# Patient Record
Sex: MALE | Race: ASIAN | NOT HISPANIC OR LATINO | URBAN - METROPOLITAN AREA
[De-identification: names, ages, dates, MRNs, and addresses within clinical notes are randomized per-mention and may not be internally consistent; named-entity substitution may affect disease eponyms.]

---

## 2017-11-21 ENCOUNTER — AMBULATORY - HEALTHEAST (OUTPATIENT)
Dept: ADMINISTRATIVE | Facility: CLINIC | Age: 67
End: 2017-11-21

## 2017-11-21 RX ORDER — ALBUTEROL SULFATE 0.83 MG/ML
2.5 SOLUTION RESPIRATORY (INHALATION) EVERY 6 HOURS PRN
Status: SHIPPED | COMMUNITY
Start: 2017-11-21

## 2017-11-21 RX ORDER — HYDROCORTISONE 10 MG/1
15 TABLET ORAL DAILY
Status: SHIPPED | COMMUNITY
Start: 2017-11-21

## 2017-11-21 RX ORDER — ALBUTEROL SULFATE 90 UG/1
2 AEROSOL, METERED RESPIRATORY (INHALATION) EVERY 4 HOURS PRN
Status: SHIPPED | COMMUNITY
Start: 2017-11-21

## 2017-11-21 RX ORDER — CODEINE PHOSPHATE AND GUAIFENESIN 10; 100 MG/5ML; MG/5ML
10 SOLUTION ORAL EVERY 4 HOURS PRN
Status: SHIPPED | COMMUNITY
Start: 2017-11-21

## 2017-11-27 ENCOUNTER — OFFICE VISIT - HEALTHEAST (OUTPATIENT)
Dept: GERIATRICS | Facility: CLINIC | Age: 67
End: 2017-11-27

## 2017-11-27 DIAGNOSIS — R53.81 PHYSICAL DECONDITIONING: ICD-10-CM

## 2017-11-27 DIAGNOSIS — J18.9 PNEUMONIA: ICD-10-CM

## 2017-11-27 DIAGNOSIS — A31.8 MYCOBACTERIUM ABSCESSUS INFECTION: ICD-10-CM

## 2017-11-27 DIAGNOSIS — E27.40 ADRENAL INSUFFICIENCY (H): ICD-10-CM

## 2017-11-27 DIAGNOSIS — M10.9 GOUT: ICD-10-CM

## 2017-11-27 DIAGNOSIS — Z86.73 H/O CEREBRAL INFARCTION: ICD-10-CM

## 2017-11-27 DIAGNOSIS — H91.90 HEARING LOSS: ICD-10-CM

## 2017-11-27 DIAGNOSIS — E46 MALNUTRITION (H): ICD-10-CM

## 2017-11-30 ENCOUNTER — OFFICE VISIT - HEALTHEAST (OUTPATIENT)
Dept: GERIATRICS | Facility: CLINIC | Age: 67
End: 2017-11-30

## 2017-11-30 DIAGNOSIS — E27.40 ADRENAL INSUFFICIENCY (H): ICD-10-CM

## 2017-11-30 DIAGNOSIS — J18.9 PNEUMONIA: ICD-10-CM

## 2017-11-30 DIAGNOSIS — J47.9 BRONCHIECTASIS (H): ICD-10-CM

## 2017-11-30 DIAGNOSIS — A31.8 MYCOBACTERIUM ABSCESSUS INFECTION: ICD-10-CM

## 2017-11-30 DIAGNOSIS — E46 MALNUTRITION (H): ICD-10-CM

## 2017-11-30 DIAGNOSIS — R53.81 PHYSICAL DECONDITIONING: ICD-10-CM

## 2017-12-04 ENCOUNTER — OFFICE VISIT - HEALTHEAST (OUTPATIENT)
Dept: GERIATRICS | Facility: CLINIC | Age: 67
End: 2017-12-04

## 2017-12-04 DIAGNOSIS — E27.40 ADRENAL INSUFFICIENCY (H): ICD-10-CM

## 2017-12-04 DIAGNOSIS — A31.8 MYCOBACTERIUM ABSCESSUS INFECTION: ICD-10-CM

## 2017-12-04 DIAGNOSIS — J47.9 BRONCHIECTASIS (H): ICD-10-CM

## 2017-12-04 DIAGNOSIS — E46 MALNUTRITION (H): ICD-10-CM

## 2017-12-04 DIAGNOSIS — R53.81 PHYSICAL DECONDITIONING: ICD-10-CM

## 2017-12-04 DIAGNOSIS — J18.9 PNEUMONIA: ICD-10-CM

## 2017-12-08 ENCOUNTER — OFFICE VISIT - HEALTHEAST (OUTPATIENT)
Dept: GERIATRICS | Facility: CLINIC | Age: 67
End: 2017-12-08

## 2017-12-08 DIAGNOSIS — J18.9 PNEUMONIA: ICD-10-CM

## 2017-12-08 DIAGNOSIS — A31.8 MYCOBACTERIUM ABSCESSUS INFECTION: ICD-10-CM

## 2017-12-08 DIAGNOSIS — R05.9 COUGH: ICD-10-CM

## 2017-12-08 DIAGNOSIS — R53.81 PHYSICAL DECONDITIONING: ICD-10-CM

## 2017-12-08 DIAGNOSIS — E46 MALNUTRITION (H): ICD-10-CM

## 2017-12-08 DIAGNOSIS — J47.9 BRONCHIECTASIS (H): ICD-10-CM

## 2017-12-20 ENCOUNTER — OFFICE VISIT - HEALTHEAST (OUTPATIENT)
Dept: GERIATRICS | Facility: CLINIC | Age: 67
End: 2017-12-20

## 2017-12-20 DIAGNOSIS — R53.81 PHYSICAL DECONDITIONING: ICD-10-CM

## 2017-12-20 DIAGNOSIS — Z86.73 H/O CEREBRAL INFARCTION: ICD-10-CM

## 2017-12-20 DIAGNOSIS — A31.8 MYCOBACTERIUM ABSCESSUS INFECTION: ICD-10-CM

## 2017-12-20 DIAGNOSIS — M10.9 GOUT: ICD-10-CM

## 2017-12-20 DIAGNOSIS — R06.02 SOB (SHORTNESS OF BREATH): ICD-10-CM

## 2018-01-24 ENCOUNTER — OFFICE VISIT - HEALTHEAST (OUTPATIENT)
Dept: GERIATRICS | Facility: CLINIC | Age: 68
End: 2018-01-24

## 2018-01-24 DIAGNOSIS — H91.90 HEARING LOSS: ICD-10-CM

## 2018-01-24 DIAGNOSIS — R06.02 SOB (SHORTNESS OF BREATH): ICD-10-CM

## 2018-01-24 DIAGNOSIS — R05.9 COUGH: ICD-10-CM

## 2018-01-24 DIAGNOSIS — A31.8 MYCOBACTERIUM ABSCESSUS INFECTION: ICD-10-CM

## 2018-01-24 DIAGNOSIS — Z86.73 H/O CEREBRAL INFARCTION: ICD-10-CM

## 2018-01-25 ENCOUNTER — OFFICE VISIT - HEALTHEAST (OUTPATIENT)
Dept: GERIATRICS | Facility: CLINIC | Age: 68
End: 2018-01-25

## 2018-01-25 DIAGNOSIS — R05.3 CHRONIC COUGH: ICD-10-CM

## 2018-01-25 DIAGNOSIS — L85.3 XEROSIS OF SKIN: ICD-10-CM

## 2018-01-25 DIAGNOSIS — A31.8 MYCOBACTERIUM ABSCESSUS INFECTION: ICD-10-CM

## 2018-02-07 ENCOUNTER — AMBULATORY - HEALTHEAST (OUTPATIENT)
Dept: GERIATRICS | Facility: CLINIC | Age: 68
End: 2018-02-07

## 2018-02-15 ENCOUNTER — OFFICE VISIT - HEALTHEAST (OUTPATIENT)
Dept: GERIATRICS | Facility: CLINIC | Age: 68
End: 2018-02-15

## 2018-02-15 DIAGNOSIS — J47.9 BRONCHIECTASIS (H): ICD-10-CM

## 2018-02-15 DIAGNOSIS — L29.9 PRURITUS: ICD-10-CM

## 2018-02-15 DIAGNOSIS — A31.8 MYCOBACTERIUM ABSCESSUS INFECTION: ICD-10-CM

## 2018-02-15 DIAGNOSIS — R05.3 CHRONIC COUGH: ICD-10-CM

## 2018-03-14 ENCOUNTER — AMBULATORY - HEALTHEAST (OUTPATIENT)
Dept: GERIATRICS | Facility: CLINIC | Age: 68
End: 2018-03-14

## 2018-03-14 RX ORDER — ALLOPURINOL 100 MG/1
100 TABLET ORAL DAILY
Status: SHIPPED | COMMUNITY
Start: 2018-03-14

## 2018-03-14 RX ORDER — DIPHENHYDRAMINE HCL 25 MG
25 CAPSULE ORAL EVERY 8 HOURS PRN
Status: SHIPPED | COMMUNITY
Start: 2018-03-14

## 2018-03-15 ENCOUNTER — RECORDS - HEALTHEAST (OUTPATIENT)
Dept: LAB | Facility: CLINIC | Age: 68
End: 2018-03-15

## 2018-03-15 LAB
ALBUMIN SERPL-MCNC: 3.2 G/DL (ref 3.5–5)
ALP SERPL-CCNC: 89 U/L (ref 45–120)
ALT SERPL W P-5'-P-CCNC: 18 U/L (ref 0–45)
ANION GAP SERPL CALCULATED.3IONS-SCNC: 8 MMOL/L (ref 5–18)
AST SERPL W P-5'-P-CCNC: 17 U/L (ref 0–40)
BASOPHILS # BLD AUTO: 0 THOU/UL (ref 0–0.2)
BASOPHILS NFR BLD AUTO: 0 % (ref 0–2)
BILIRUB SERPL-MCNC: 0.4 MG/DL (ref 0–1)
BUN SERPL-MCNC: 6 MG/DL (ref 8–22)
CALCIUM SERPL-MCNC: 8.3 MG/DL (ref 8.5–10.5)
CHLORIDE BLD-SCNC: 105 MMOL/L (ref 98–107)
CHOLEST SERPL-MCNC: 135 MG/DL
CO2 SERPL-SCNC: 28 MMOL/L (ref 22–31)
CREAT SERPL-MCNC: 0.74 MG/DL (ref 0.7–1.3)
EOSINOPHIL # BLD AUTO: 0.4 THOU/UL (ref 0–0.4)
EOSINOPHIL NFR BLD AUTO: 6 % (ref 0–6)
ERYTHROCYTE [DISTWIDTH] IN BLOOD BY AUTOMATED COUNT: 17.7 % (ref 11–14.5)
FASTING STATUS PATIENT QL REPORTED: ABNORMAL
GFR SERPL CREATININE-BSD FRML MDRD: >60 ML/MIN/1.73M2
GLUCOSE BLD-MCNC: 77 MG/DL (ref 70–125)
HCT VFR BLD AUTO: 35 % (ref 40–54)
HDLC SERPL-MCNC: 36 MG/DL
HGB BLD-MCNC: 10.8 G/DL (ref 14–18)
LDLC SERPL CALC-MCNC: 83 MG/DL
LYMPHOCYTES # BLD AUTO: 0.9 THOU/UL (ref 0.8–4.4)
LYMPHOCYTES NFR BLD AUTO: 13 % (ref 20–40)
MCH RBC QN AUTO: 24.2 PG (ref 27–34)
MCHC RBC AUTO-ENTMCNC: 30.9 G/DL (ref 32–36)
MCV RBC AUTO: 79 FL (ref 80–100)
MONOCYTES # BLD AUTO: 0.7 THOU/UL (ref 0–0.9)
MONOCYTES NFR BLD AUTO: 11 % (ref 2–10)
NEUTROPHILS # BLD AUTO: 4.9 THOU/UL (ref 2–7.7)
NEUTROPHILS NFR BLD AUTO: 71 % (ref 50–70)
OVALOCYTES: ABNORMAL
PLAT MORPH BLD: NORMAL
PLATELET # BLD AUTO: 178 THOU/UL (ref 140–440)
PMV BLD AUTO: 10.6 FL (ref 8.5–12.5)
POTASSIUM BLD-SCNC: 3.3 MMOL/L (ref 3.5–5)
PROT SERPL-MCNC: 6.2 G/DL (ref 6–8)
RBC # BLD AUTO: 4.46 MILL/UL (ref 4.4–6.2)
SCHISTOCYTES: ABNORMAL
SODIUM SERPL-SCNC: 141 MMOL/L (ref 136–145)
TRIGL SERPL-MCNC: 80 MG/DL
URATE SERPL-MCNC: 8.9 MG/DL (ref 3–8)
WBC: 6.9 THOU/UL (ref 4–11)

## 2018-03-28 ENCOUNTER — RECORDS - HEALTHEAST (OUTPATIENT)
Dept: LAB | Facility: CLINIC | Age: 68
End: 2018-03-28

## 2018-03-29 LAB — URATE SERPL-MCNC: 5.7 MG/DL (ref 3–8)

## 2018-04-11 ENCOUNTER — OFFICE VISIT - HEALTHEAST (OUTPATIENT)
Dept: GERIATRICS | Facility: CLINIC | Age: 68
End: 2018-04-11

## 2018-04-11 DIAGNOSIS — M10.9 GOUT: ICD-10-CM

## 2018-04-11 DIAGNOSIS — A31.8 MYCOBACTERIUM ABSCESSUS INFECTION: ICD-10-CM

## 2018-04-11 DIAGNOSIS — E87.6 HYPOKALEMIA: ICD-10-CM

## 2018-04-11 RX ORDER — DIPHENHYDRAMINE HYDROCHLORIDE, ZINC ACETATE 2; .1 G/100G; G/100G
1 CREAM TOPICAL 3 TIMES DAILY PRN
Status: SHIPPED | COMMUNITY
Start: 2018-04-11

## 2018-04-11 RX ORDER — PREDNISOLONE ACETATE 10 MG/ML
1 SUSPENSION/ DROPS OPHTHALMIC DAILY
Status: SHIPPED | COMMUNITY
Start: 2018-04-11

## 2018-04-11 RX ORDER — VALACYCLOVIR HYDROCHLORIDE 1 G/1
1000 TABLET, FILM COATED ORAL AT BEDTIME
Status: SHIPPED | COMMUNITY
Start: 2018-04-11

## 2018-04-12 ENCOUNTER — RECORDS - HEALTHEAST (OUTPATIENT)
Dept: LAB | Facility: CLINIC | Age: 68
End: 2018-04-12

## 2018-04-12 LAB
ANION GAP SERPL CALCULATED.3IONS-SCNC: 10 MMOL/L (ref 5–18)
BUN SERPL-MCNC: 7 MG/DL (ref 8–22)
CALCIUM SERPL-MCNC: 8.3 MG/DL (ref 8.5–10.5)
CHLORIDE BLD-SCNC: 106 MMOL/L (ref 98–107)
CO2 SERPL-SCNC: 25 MMOL/L (ref 22–31)
CREAT SERPL-MCNC: 0.71 MG/DL (ref 0.7–1.3)
GFR SERPL CREATININE-BSD FRML MDRD: >60 ML/MIN/1.73M2
GLUCOSE BLD-MCNC: 91 MG/DL (ref 70–125)
POTASSIUM BLD-SCNC: 3.3 MMOL/L (ref 3.5–5)
SODIUM SERPL-SCNC: 141 MMOL/L (ref 136–145)

## 2018-04-18 ENCOUNTER — RECORDS - HEALTHEAST (OUTPATIENT)
Dept: LAB | Facility: CLINIC | Age: 68
End: 2018-04-18

## 2018-04-19 LAB — POTASSIUM BLD-SCNC: 3.9 MMOL/L (ref 3.5–5)

## 2018-04-24 ENCOUNTER — OFFICE VISIT - HEALTHEAST (OUTPATIENT)
Dept: GERIATRICS | Facility: CLINIC | Age: 68
End: 2018-04-24

## 2018-04-24 DIAGNOSIS — M10.9 GOUT: ICD-10-CM

## 2018-04-24 DIAGNOSIS — E87.6 HYPOKALEMIA: ICD-10-CM

## 2018-04-24 DIAGNOSIS — R05.3 CHRONIC COUGH: ICD-10-CM

## 2018-04-24 DIAGNOSIS — A31.8 MYCOBACTERIUM ABSCESSUS INFECTION: ICD-10-CM

## 2018-05-02 ENCOUNTER — AMBULATORY - HEALTHEAST (OUTPATIENT)
Dept: GERIATRICS | Facility: CLINIC | Age: 68
End: 2018-05-02

## 2021-05-27 ENCOUNTER — RECORDS - HEALTHEAST (OUTPATIENT)
Dept: ADMINISTRATIVE | Facility: CLINIC | Age: 71
End: 2021-05-27

## 2021-05-31 VITALS — WEIGHT: 110.8 LBS

## 2021-05-31 VITALS — WEIGHT: 113 LBS

## 2021-06-01 VITALS — WEIGHT: 121.4 LBS

## 2021-06-14 NOTE — PROGRESS NOTES
[facility]    Facility:   [facility] [[id number]]   Code Status: DNR      CHIEF COMPLAINT/REASON FOR VISIT:  Chief Complaint   Patient presents with     Review Of Multiple Medical Conditions       HISTORY:      HPI: Andriy is a 66 y.o. male seen today in ff up ofhismultpile medical problems.   He was sleeping soundly when I visited. Awakened from my name callling.   Feelign stronger. Able to eat better.   NO specific complaints, .however noted to be tachycardic  Family brings in food for him   Still with cough    Past Medical History:   Diagnosis Date     Hearing loss      Mycobacterium abscessus infection      Pneumonia      Sepsis      Sinusitis      Thrombocytopenia      Tophaceous gout      Transient neurologic deficit              No family history on file.  Social History     Social History     Marital status:      Spouse name: N/A     Number of children: N/A     Years of education: N/A     Social History Main Topics     Smoking status: Never Smoker     Smokeless tobacco: Never Used     Alcohol use No     Drug use: Not on file     Sexual activity: Not on file     Other Topics Concern     Not on file     Social History Narrative     No narrative on file         Review of Systems  Unremarkable otherwise  .There were no vitals filed for this visit.    Physical Exam    VS noted, tachycardic on exam, regualr  Patient is alert, awake, oriented to time, place and person, not in acute cardiorespiratory distress  Skin: Warm, and moist,  no lesions,   Head: atraumatic, normocephalic,   Eyes: EOM's intact and conjugate, pink palpebral conjunctivae, anicteric sclerae, pupils reactive  Lungs :decreased,  to auscultation , no crackles, wheezes(+) bilat  rhonchi  Heart : tachyc ardic, Nornal first and second heart sounds, No murmurs, heaves, or thrills  Abdomen: Soft, non tender, non distended, no hepatosplenomegaly, no ascites  Extremities: No edema, pulses are full and  equal      LABS:       ASSESSMENT:      ICD-10-CM    1. Pneumonia J18.9    2. Bronchiectasis J47.9    3. Adrenal insufficiency E27.40    4. Mycobacterium abscessus infection A31.9    5. Physical deconditioning R53.81    6. Malnutrition E46        PLAN:    Change albuterol nebs to xopenex.   No symptoms indicating worseing of resp status . Lone tachy .   Monitor closely with change in nebs.   Low tolerance for furhter work up , as he is very frail,       Total 25 minutes of which 55% was spent counseling and coordination of care of the above plan.    Electronically signed by: Wen Anaya MD

## 2021-06-14 NOTE — PROGRESS NOTES
[facility]    DATE: 2017    NAME:  Andriy Tena             :  1950    MRN: [id number]    CODE STATUS:  DNR    VISIT TYPE: ACUTE  FACILITY: [facility] [[id number]]     ROOM: 323    CHIEF COMPLAIN/REASON FOR VISIT:  Chief Complaint   Patient presents with     Problem Visit     SOB     HISTORY OF PRESENT ILLNESS: Andriy Tena is a 67 y.o. male being seen at the request of the patient for increased SOB.  He is afebrile. Oxygen saturations dropped to as low as 87%. He states he only  Gets SOB when he coughs. He has Guaifenesin-Codeisne at the bedside to self administer.  He is also on inhaled steroids.  Lungs have crackles at the bases.   He has a history of Mycobacterium abscess some which was diagnosed back in . He is prone to respiratory infections.      No Known Allergies:     Current Outpatient Prescriptions   Medication Sig     acetaminophen (TYLENOL) 325 MG tablet Take 650 mg by mouth every 4 (four) hours as needed for pain.     acyclovir (ZOVIRAX) 400 MG tablet Take 400 mg by mouth 2 (two) times a day.     albuterol (PROAIR HFA;PROVENTIL HFA;VENTOLIN HFA) 90 mcg/actuation inhaler Inhale 2 puffs every 4 (four) hours as needed for wheezing.     albuterol (PROVENTIL) 2.5 mg /3 mL (0.083 %) nebulizer solution Take 2.5 mg by nebulization every 6 (six) hours as needed for wheezing.      artificial tears,hypromellose, (GENTEAL; SYSTANE) 0.3 % Gel Administer 1 application to the right eye 4 (four) times a day.     codeine-guaiFENesin (GUAIFENESIN AC)  mg/5 mL liquid Take 10 mL by mouth every 4 (four) hours as needed.      colchicine 0.6 mg tablet Take 0.6 mg by mouth daily.     fluticasone-vilanterol (BREO ELLIPTA) 100-25 mcg/dose DsDv inhaler Take 1 Inhalation by mouth daily.     hydrocortisone (CORTEF) 10 MG tablet Take 15 mg by mouth daily.     REVIEW OF SYSTEMS:    Currently, no fever, chills, or rigors. Does not have any visual or hearing problems. Denies any  chest pain, headaches, palpitations, lightheadedness, or dizziness. Appetite is good. Denies any GERD symptoms. Denies any difficulty with swallowing, nausea, or vomiting.  Denies any abdominal pain, diarrhea or constipation. Denies any urinary symptoms. No insomnia. No active bleeding. No rash.     PHYSICAL EXAMINATION:  Vitals:    12/20/17 2154   BP: 106/61   Pulse: 91   Resp: 18   Temp: 97.7  F (36.5  C)   SpO2: (!) 89%   Weight: 110 lb 12.8 oz (50.3 kg)         GENERAL: Awake, Alert, oriented x3, not in any form of acute distress, answers questions appropriately, follows simple commands, conversant  HEENT: Head is normocephalic with normal hair distribution. No evidence of trauma. Ears: No acute purulent discharge. Eyes: Conjunctivae pink with no scleral jaundice. Nose: Normal mucosa and septum. NECK: Supple with no cervical or supraclavicular lymphadenopathy. Trachea is midline.   CHEST: No tenderness or deformity, no crepitus  LUNG:Crackles at the bases. There are no crackles or wheezes, normal AP diameter.  BACK: No kyphosis of the thoracic spine. Symmetric, no curvature, ROM normal, no CVA tenderness, no spinal tenderness   CVS: There is good S1  S2, there are no murmurs, rubs, gallops, or heaves, rhythm is regular,  2+ pulses symmetric in all extremities.  ABDOMEN: Globular and soft, nontender to palpation, non distended, no masses, no organomegaly, good bowel sounds, no rebound or guarding, no peritoneal signs.   EXTREMITIES: . Full range of motion on both upper and lower extremities, there is no tenderness to palpation, no pedal edema, no cyanosis or clubbing, no calf tenderness.  Pulses equal in all extremities, normal cap refill, no joint swelling.  SKIN: Warm and dry, no erythema noted.  Skin color, texture, no rashes or lesions.  NEUROLOGICAL: The patient is oriented to person, place and time. Strength and sensation are grossly intact. Face is symmetric.    LABS:    Lab Results   Component Value Date     WBC 6.7 12/06/2017    HGB 8.2 (L) 12/06/2017    HCT 26.0 (L) 12/06/2017    MCV 73 (L) 12/06/2017     12/06/2017     ASSESSMENT/PLAN:    1. Mycobacterium abscessus infection - He had been to many specialists including the [facility].  It has been tried on several regimens.  However unfortunately developed severe side effects from these medications and so had never completed them.     2. SOB (shortness of breath) - Nelson ldo a CXR, schedule nebulizer treatments QID, and prn Oxygen therapy 2L via NC to keep saturations > 90%   3. Physical deconditioning - Ongoing   4. H/O cerebral infarction - No new neurological symptoms   5. Gout - Continue Colcrys            Electronically signed by:  Jacob Gray CNP    35 minutes spent of which greater than 50% was face to face communication with the patient about above plan of care

## 2021-06-14 NOTE — PROGRESS NOTES
[facility]      Facility:    [facility] [[id number]]  Code Status: DNR      Chief Complaint/Reason for Visit:  Chief Complaint   Patient presents with     H & P       HPI:   Andriy is a 66 y.o. male who  was admitted to [facility] on November 12 and transferred to this facility November 20.  He is Hmong by race  He is  and has 5 kids.  He remains in good contact with his amily.  He also has a sister Reina who was involved with his care who I metduring my visits with himat the TCU.  He went to the hospital secondary to progressive weakness cough and confusion.  Apparently he had been given Levaquin for what was felt to be respiratory infections in the 2 weeks prior to his admission.  In the days that followed, he was noted to be quite confused and not acting right.  He continued to be weak in the next ensuing days.  He was febrile on presentation to the hospital at 10 3 F.  He was also hypotensive.     Further evaluation shows dense infiltrates on his lungs.  There were also signs of bronchiectasis.     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  He had been to many specialists including the [facility].  It has been tried on several regimens.  However unfortunately developed severe side effects from these medications and so had never completed them.  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     Infectious disease was involved with his care during this admission, noted that he did not need to be placed on isolation.  Was placed on vancomycin and azithromycin and Zosyn and transitioned to orals.  He completed medication/antibiotics before discharge.  Code was called on November 17 stroke upon noticing right facial droop and weakness in the right upper extremity.  Imaging study including CT scan and MRI was negative.  An EEG was negative for seizures thought to be  related to underlying infection.     He also developed adrenal insufficiency and was placed on hydrocortisone.     Develop acute flare of gout placed on colchicine.  He had lost so much weight at his baseline he weighs 150 pounds he is now down to 114 pounds.  Appetite has picked up.  However could not eat a lot because he feels full easily.     He was told by his specialist that they cannot offer other treatments anymore with regards to his Mycobacterium abscess some.  He was also told that he will simply decline progressively and eventually die.  This causes a lot of stress and upset in whole family.  Currently denies any headache or dizziness.  No nausea no vomiting.     No abdominal pain.  No adrenal insufficiency or adrenal crisis symptoms.  He is now off of oxygen.  No coughing.  No fevers or chills    He was transferred to the transitional care unit for further therapies and medication management.  He did pretty well with physical therapy.  Soon enough, she was graduated from therapy.  However it was felt best to transfer him upstairs to long-term care because of his inability to care for himself because of weakness malnutrition and her overall illness.    He developed tachycardia increased shortness of breath and cough in that his last few days in the TCU.  Chest x-ray and blood work shows again pulmonary infiltrates and he was restarted on Levaquin.  Today he seems a lot better.  He has been eating although not much but he is trying to eat as much as he can.  Sister brings in food for him to eat.    I had introduced oriental medicine and how it may help him at this point to increase the body's immune system in their journey with this type of infection.  They have not heard from them as yet.  They are not certain if insurance would cover these services.    When he got sick with tachycardia and shortness of breath, I changed his albuterol to Xopenex.  I also added  Mucomyst nebs.  However he did not tolerate  the Mucomyst.  He kept coughing and coughing during nebulization.    Denies any fevers or chills.  He has been walking within his room just fine.  He actually does not need any assistive device.    Past Medical History:  Past Medical History:   Diagnosis Date     Hearing loss      Mycobacterium abscessus infection      Pneumonia      Sepsis      Sinusitis      Thrombocytopenia      Tophaceous gout      Transient neurologic deficit            Surgical History:  Past Surgical History:   Procedure Laterality Date     SINUS SURGERY         Family History:   No family history on file.    Social History:    Social History     Social History     Marital status:      Spouse name: N/A     Number of children: N/A     Years of education: N/A     Social History Main Topics     Smoking status: Never Smoker     Smokeless tobacco: Never Used     Alcohol use No     Drug use: Not on file     Sexual activity: Not on file     Other Topics Concern     Not on file     Social History Narrative     No narrative on file          Review of Systems  Unremarkable except for those noted above.  There were no vitals filed for this visit.    Physical Exam  Vital signs noted.  Stable.  Patient is alert, awake, oriented to time, place and person, not in acute cardiorespiratory distress, cachectic   skin: Warm, and moist,  no lesions,   Head: atraumatic, normocephalic,   Eyes: EOM's intact and conjugate, pink palpebral conjunctivae, anicteric sclerae, pupils reactive  Lungs : On auscultation , there were bilateral basal crackles, no wheezes bilateral basal rhonchi noted  Heart : Nornal first and second heart sounds, No murmurs, heaves, or thrills  Abdomen: Soft, non tender, non distended, no hepatosplenomegaly, no ascites  Extremities: No edema, pulses are full and equal      Medication List:  Current Outpatient Prescriptions   Medication Sig     acetaminophen (TYLENOL) 325 MG tablet Take 650 mg by mouth every 4 (four) hours as needed for  pain.     albuterol (PROAIR HFA;PROVENTIL HFA;VENTOLIN HFA) 90 mcg/actuation inhaler Inhale 2 puffs every 4 (four) hours as needed for wheezing.     albuterol (PROVENTIL) 2.5 mg /3 mL (0.083 %) nebulizer solution Take 2.5 mg by nebulization every 4 (four) hours as needed for wheezing.     codeine-guaiFENesin (GUAIFENESIN AC)  mg/5 mL liquid Take 10 mL by mouth at bedtime.     colchicine 0.6 mg tablet Take 0.6 mg by mouth daily.     fluticasone-salmeterol (ADVAIR) 100-50 mcg/dose DISKUS Inhale 1 puff every 12 (twelve) hours.     hydrocortisone (CORTEF) 10 MG tablet Take 15 mg by mouth daily.       Labs:  Recent Results (from the past 168 hour(s))   HM1 (CBC with Diff)   Result Value Ref Range    WBC 6.7 4.0 - 11.0 thou/uL    RBC 3.57 (L) 4.40 - 6.20 mill/uL    Hemoglobin 8.2 (L) 14.0 - 18.0 g/dL    Hematocrit 26.0 (L) 40.0 - 54.0 %    MCV 73 (L) 80 - 100 fL    MCH 23.0 (L) 27.0 - 34.0 pg    MCHC 31.5 (L) 32.0 - 36.0 g/dL    RDW 18.9 (H) 11.0 - 14.5 %    Platelets 256 140 - 440 thou/uL    MPV 12.8 (H) 8.5 - 12.5 fL    Neutrophils % 74 (H) 50 - 70 %    Lymphocytes % 11 (L) 20 - 40 %    Monocytes % 13 (H) 2 - 10 %    Eosinophils % 1 0 - 6 %    Basophils % 0 0 - 2 %    Neutrophils Absolute 4.9 2.0 - 7.7 thou/uL    Lymphocytes Absolute 0.8 0.8 - 4.4 thou/uL    Monocytes Absolute 0.9 0.0 - 0.9 thou/uL    Eosinophils Absolute 0.1 0.0 - 0.4 thou/uL    Basophils Absolute 0.0 0.0 - 0.2 thou/uL   Procalcitonin   Result Value Ref Range    Procalcitonin 0.32 0.00 - 0.49 ng/mL   Manual Differential   Result Value Ref Range    Platelet Estimate Normal Normal    Ovalocytes 1+ (!) Negative    Schistocytes 1+ (!) Negative         Assessment:    ICD-10-CM    1. Bronchiectasis J47.9    2. Cough R05    3. Malnutrition E46    4. Pneumonia J18.9    5. Mycobacterium abscessus infection A31.9    6. Physical deconditioning R53.81        Plan:  He is almost done with his 10 day course of Levaquin.  We will continue this to  completion.  Reviewed the results of CBC pro-calcitonin.  WBC had indeed gone down.  Pro-calcitonin is normal.  Stressed the importance of building up his nutrition.  Change Xopenex back to albuterol and discontinue Mucomyst nebs.  He may take his own cough medication  The bottle can be kept at this bedside.  He likes the exact formulation that his pharmacist gives him so we will give him the choice to feel his own guaifenesin with codeine.  Should the refill run out I can give him a paper prescription and sister will personally bring it to the pharmacy to get filled.  I asked the nursing staff to follow-up with the referral to [employer] [facility]  in [place].  No other available treatments for Mycobacterium in his case, since he developed severe side effects from the agents that has been shown in the few literature to be effective against Mycobacterium abscess some.  He also goes to [facility] as apparently he had gotten a bad viral infection in the eye for which he takes acyclovir.  He has an appointment with them tomorrow.  Okay to take acyclovir 5 times a day per his eye doctor.    Total time spent was 60 minutes with more than 50% spent on counseling, discussion of treatment plan and extensive review of available records  This note has been dictated using voice recognition software. Any grammatical, typographical, or context distortions are unintentional.            Electronically signed by: Wen Anaya MD

## 2021-06-14 NOTE — PROGRESS NOTES
[facility]    Facility:   [facility] [[id number]]   Code Status: DNR      CHIEF COMPLAINT/REASON FOR VISIT:  Chief Complaint   Patient presents with     Review Of Multiple Medical Conditions       HISTORY:      HPI: Andriy is a 66 y.o. male was seen today in close follow-up of his multiple issues.  Patient did not do well over the weekend.  He was tachycardic and was having more shortness of breath.  Also he had fevers.  CBC obtained showed leukocytosis with neutrophilic predominance.  Chest x-ray showed infiltrates on the left as well as on the right lung field.  This is suggestive of pneumonia.  He was then started on Levaquin.  This would have been his third day now on the medication.  Nursing staff noted quite an improvement in his saturation levels.  His saturation went down into the 80s at the time he was sick.  He was also very tachypneic and tachycardic.  Today he is not as short of breath.  He is still mildly tachycardic upon my examination he was around 110-112.  Blood pressures 104/55.    He is coughing but finds it hard to bring up his sputum.  He is starting to eat better.      Past Medical History:   Diagnosis Date     Hearing loss      Mycobacterium abscessus infection      Pneumonia      Sepsis      Sinusitis      Thrombocytopenia      Tophaceous gout      Transient neurologic deficit              No family history on file.  Social History     Social History     Marital status:      Spouse name: N/A     Number of children: N/A     Years of education: N/A     Social History Main Topics     Smoking status: Never Smoker     Smokeless tobacco: Never Used     Alcohol use No     Drug use: Not on file     Sexual activity: Not on file     Other Topics Concern     Not on file     Social History Narrative     No narrative on file         Review of Systems  As above otherwise unremarkable  .There were no vitals filed for this visit.    Physical Exam  Vital signs noted.   Blood pressure 104/5592% on room air.  Tachycardic heart rate 112 regular.  Patient is alert, awake, oriented to time, place and person, not in acute cardiorespiratory distress  Skin: Warm, and moist,  no lesions,   Head: atraumatic, normocephalic,   Eyes: EOM's intact and conjugate, pink palpebral conjunctivae, anicteric sclerae, pupils reactive  Lungs : Decreased but clear to auscultation , no crackles, wheezes or rhonchi  Heart : Tachycardic, Nornal first and second heart sounds, No murmurs, heaves, or thrills  Abdomen: Soft, non tender, non distended, no hepatosplenomegaly, no ascites  Extremities: No edema, pulses are full and equal      LABS:   Recent Results (from the past 72 hour(s))   HM1 (CBC with Diff)   Result Value Ref Range    WBC 15.7 (H) 4.0 - 11.0 thou/uL    RBC 4.06 (L) 4.40 - 6.20 mill/uL    Hemoglobin 9.3 (L) 14.0 - 18.0 g/dL    Hematocrit 29.7 (L) 40.0 - 54.0 %    MCV 73 (L) 80 - 100 fL    MCH 22.9 (L) 27.0 - 34.0 pg    MCHC 31.3 (L) 32.0 - 36.0 g/dL    RDW 19.7 (H) 11.0 - 14.5 %    Platelets 229 140 - 440 thou/uL    MPV  8.5 - 12.5 fL    Neutrophils % 83 (H) 50 - 70 %    Lymphocytes % 5 (L) 20 - 40 %    Monocytes % 12 (H) 2 - 10 %    Eosinophils % 0 0 - 6 %    Basophils % 0 0 - 2 %    Neutrophils Absolute 12.9 (H) 2.0 - 7.7 thou/uL    Lymphocytes Absolute 0.8 0.8 - 4.4 thou/uL    Monocytes Absolute 1.8 (H) 0.0 - 0.9 thou/uL    Eosinophils Absolute 0.0 0.0 - 0.4 thou/uL    Basophils Absolute 0.0 0.0 - 0.2 thou/uL         ASSESSMENT:      ICD-10-CM    1. Pneumonia J18.9    2. Bronchiectasis J47.9    3. Mycobacterium abscessus infection A31.9    4. Adrenal insufficiency E27.40    5. Physical deconditioning R53.81    6. Malnutrition E46        PLAN:    Continue and complete Levaquin full course.  Considering the fact that he is got clinical improvement with Levaquin alone, I believe we can safely monitor him on this 1 antibiotic.  He does have Mycobacterium abscesses and last AFB stain remains  positive.  Likely secondary to no treatment available.  He did not tolerate amikacin as it caused deafness and the nasal lid and gave him such thrombocytopenia that his treating doctors needed to stop treatment.  According to infectious disease, he did not be isolated.  Except for immunocompromised individuals.  Denies any abdominal pain.  Denies any severe weakness.  Continue on current dose of hydrocortisone without any adjustment of the dose.  I will place him on scheduled nebs of Xopenex.  Start Mucomyst nebulization 3 times a day to mobilize secretion.  Also scheduled for CT scan of the chest in pro-calcitonin and CBC for close follow-up.  We had talked about consulting with an integrative medicine practitioner such as a TCM provider to help improve his body's immune system and its innate ability to heal.  I have written this order we will find out if his insurance covers this services.        Total 35 minutes of which 55% was spent counseling and coordination of care of the above plan.    Electronically signed by: Wen Anaya MD

## 2021-06-14 NOTE — PROGRESS NOTES
[facility]      Facility:    [facility] [[id number]]  Code Status: DNR      Chief Complaint/Reason for Visit:  Chief Complaint   Patient presents with     H & P       HPI:   Andriy is a 66 y.o. male who was admitted to [facility] on November 12 and transferred to this facility November 20.  He is among by evette.  He is  and has 5 kids.  He remains in good contact with his family.  He also has a sister who was involved with his care who I met today visiting with him.  He went to the hospital secondary to progressive weakness cough and confusion.  Apparently he had been given Levaquin for what was felt to be respiratory infections in the 2 weeks prior to his admission.  In the days that followed, he was noted to be quite confused and not acting right.  He continued to be weak in the next ensuing days.  He was febrile on presentation to the hospital at 10 3 F.  He was also hypotensive.    Further evaluation shows dense infiltrates on his lungs.  There were also signs of bronchiectasis.    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  He had been to many specialists including the [facility].  It has been tried on several regimens.  However unfortunately developed severe side effects from these medications and so had never completed them.  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    Infectious disease was involved with his care during this admission, noted that he did not need to be placed on isolation.  Was placed on vancomycin and azithromycin and Zosyn and transitioned to orals.  He completed medication/antibiotics before discharge.  Code was called on November 17 stroke upon noticing right facial droop and weakness in the right upper extremity.  Imaging study including CT scan and MRI was negative.  An EEG was negative for seizures thought to be related to underlying  infection.    He also developed adrenal insufficiency and was placed on hydrocortisone.    Develop acute flare of gout placed on colchicine.  He had lost so much weight at his baseline he weighs 150 pounds he is now down to 114 pounds.  Appetite has picked up.  However could not eat a lot because he feels full easily.    He was told by his specialist that they cannot offer other treatments anymore with regards to his Mycobacterium abscess some.  He was also told that he will simply decline progressively and eventually die.  This causes a lot of stress and upset in whole family.  Currently denies any headache or dizziness.  No nausea no vomiting.    No abdominal pain.  No adrenal insufficiency or adrenal crisis symptoms.  He is now off of oxygen.  No coughing.  No fevers or chills    Past Medical History:  Past Medical History:   Diagnosis Date     Hearing loss      Mycobacterium abscessus infection      Pneumonia      Sepsis      Sinusitis      Thrombocytopenia      Tophaceous gout      Transient neurologic deficit            Surgical History:  Past Surgical History:   Procedure Laterality Date     SINUS SURGERY         Family History:   No family history on file.    Social History:    Social History     Social History     Marital status:      Spouse name: N/A     Number of children: N/A     Years of education: N/A     Social History Main Topics     Smoking status: Never Smoker     Smokeless tobacco: Never Used     Alcohol use No     Drug use: Not on file     Sexual activity: Not on file     Other Topics Concern     Not on file     Social History Narrative     No narrative on file          Review of Systems  Unremarkable  There were no vitals filed for this visit.    Physical Exam  Vital signs noted and stable  Patient is alert, awake, oriented to time, place and person, not in acute cardiorespiratory distress  Skin: Warm, and moist,  no lesions,   Head: atraumatic, normocephalic,   Eyes: EOM's intact and  conjugate, pink palpebral conjunctivae, anicteric sclerae, pupils reactive  Lungs : Bilateral rhonchi noted especially at the bases heart : Nornal first and second heart sounds, No murmurs, heaves, or thrills  Abdomen: Soft, non tender, non distended, no hepatosplenomegaly, no ascites  Extremities: No edema, pulses are full and equal      Medication List:  Current Outpatient Prescriptions   Medication Sig     acetaminophen (TYLENOL) 325 MG tablet Take 650 mg by mouth every 4 (four) hours as needed for pain.     albuterol (PROAIR HFA;PROVENTIL HFA;VENTOLIN HFA) 90 mcg/actuation inhaler Inhale 2 puffs every 4 (four) hours as needed for wheezing.     albuterol (PROVENTIL) 2.5 mg /3 mL (0.083 %) nebulizer solution Take 2.5 mg by nebulization every 4 (four) hours as needed for wheezing.     codeine-guaiFENesin (GUAIFENESIN AC)  mg/5 mL liquid Take 10 mL by mouth at bedtime.     colchicine 0.6 mg tablet Take 0.6 mg by mouth daily.     fluticasone-salmeterol (ADVAIR) 100-50 mcg/dose DISKUS Inhale 1 puff every 12 (twelve) hours.     hydrocortisone (CORTEF) 10 MG tablet Take 15 mg by mouth daily.       Labs:  No results found for this or any previous visit (from the past 72 hour(s)).      Assessment:    ICD-10-CM    1. Pneumonia J18.9    2. Physical deconditioning R53.81    3. Malnutrition E46    4. Mycobacterium abscessus infection A31.9    5. Adrenal insufficiency E27.40    6. H/O cerebral infarction Z86.73    7. Hearing loss H91.90    8. Gout M10.9        Plan:  He had completed antibiotic in the hospital.  He will need to focus on improving his physical condition via physical therapy occupational therapy and nutrition.  Stressed the importance of rebuilding muscle strength and improving nutrition status to improve immune system I also suggested dietary therapy as well as herbal therapy to complement current treatment regimen with a goal of increasing the body's immune system with the hope of containing this  Mycobacterium.  I will start him on multivitamin with glycine.  To increase or improve appetite.  Continue with colchicine for gout.  Continue hydrocortisone for adrenal insufficiency.    Total time spent was 60 minutes with more than 50% spent on counseling, discussion of treatment plan and extensive review of available records  This note has been dictated using voice recognition software. Any grammatical, typographical, or context distortions are unintentional.          Electronically signed by: Wen Anaya MD

## 2021-06-15 NOTE — PROGRESS NOTES
[facility]    Facility:   [facility] [[id number]]   Code Status: POLST AVAILABLE      CHIEF COMPLAINT/REASON FOR VISIT:  Chief Complaint   Patient presents with     Problem Visit     back itch, chronic cough       HISTORY:      HPI: Andriy is a 67 y.o. male seen today at the request of RN staff. He has been doing well and thriving well here at the . He has mycobacterium abscessum, not curable, saw infectious recently, no further recs. Other than Geno Shrestha.  I have suggested that he go to [facility] a few months ago.  He has not gone yet.  I am unable to tell the reason whether it is insurance purposes or transportation or what.  He complains of itchy back.  He is wanting to have me evaluate if there is any rash.    He has been walking a lot further.  He is now completely ambulatory.  Does feel short of breath after coughing spell.  No recent fevers no recent increase cough and sputum production.  Past Medical History:   Diagnosis Date     Hearing loss      Mycobacterium abscessus infection      Pneumonia      Sepsis      Sinusitis      Thrombocytopenia      Tophaceous gout      Transient neurologic deficit              No family history on file.  Social History     Social History     Marital status:      Spouse name: N/A     Number of children: N/A     Years of education: N/A     Social History Main Topics     Smoking status: Never Smoker     Smokeless tobacco: Never Used     Alcohol use No     Drug use: Not on file     Sexual activity: Not on file     Other Topics Concern     Not on file     Social History Narrative     No narrative on file         Review of Systems  Unremarkable   .There were no vitals filed for this visit.    Physical Exam  Vital signs are stable.  She had actually gained weight from 105-113 pounds this is a good thing.  He is ambulating very well.  Appears a lot stronger.  Patient is alert, awake,  oriented to time, place and person, not in acute cardiorespiratory distress  Skin: Warm, and very dry however no rash,  no lesions,   Head: atraumatic, normocephalic,   Eyes: EOM's intact and conjugate, pink palpebral conjunctivae, anicteric sclerae, pupils reactive  Lungs : Clear to auscultation , no crackles, wheezes or rhonchi  Heart : Nornal first and second heart sounds, No murmurs, heaves, or thrills  Abdomen: Soft, non tender, non distended, no hepatosplenomegaly, no ascites  Extremities: No edema, pulses are full and equal      LABS:   /No results found for this or any previous visit (from the past 72 hour(s)).      ASSESSMENT:      ICD-10-CM    1. Xerosis of skin L85.3    2. Chronic cough R05    3. Mycobacterium abscessus infection A31.9        PLAN:    Encouraged to use moisturizing lotion 2-3 times a day.  I reassured that the rash is most likely from very dry air from the winter and heating.  Reviewed medications with him.  He wants to have the nursing staff explained to him each medication he takes and I have asked them to do so.     follow-up on referral to [facility].    Total 25 minutes of which 55% was spent counseling and coordination of care of the above plan.    Electronically signed by: Wen Anaya MD

## 2021-06-15 NOTE — PROGRESS NOTES
[facility]    DATE: 2018    NAME:  Andriy Tena             :  1950  MRN: [id number]  CODE STATUS:  DNR    FACILITY:  [facility] [[id number]]       ROOM:   323    CHIEF COMPLAINT/REASON FOR VISIT:  Chief Complaint   Patient presents with     Review Of Multiple Medical Conditions     HISTORY OF PRESENT ILLNESS: Andriy Tena is a 67 y.o. male with Thrombocytopenia, Tophaceous gout, Transient neurologic deficit, and Mycobacterium abscessus is being seen in the [facility] facility for follow and management of multiple chronic medical conditions.  His Mycobacterium abscessus is not curable.  He saw infectious recently and no further recommendations were initiated except for theTessalon Perles for the cough. Staff reports that patient complains of an itchy back.  After examination, this appears to be from dry skin and not an actual rash. Overall, he is doing well.  He is now ambulatory.  Normotensive.  No change in his chronic cough.  Appetite is fair - family brings food from home.     Past Medical History:   Diagnosis Date     Hearing loss      Mycobacterium abscessus infection      Pneumonia      Sepsis      Sinusitis      Thrombocytopenia      Tophaceous gout      Transient neurologic deficit      Past Surgical History:   Procedure Laterality Date     SINUS SURGERY       No family history on file.  Social History     Social History     Marital status:      Spouse name: N/A     Number of children: N/A     Years of education: N/A     Occupational History     Not on file.     Social History Main Topics     Smoking status: Never Smoker     Smokeless tobacco: Never Used     Alcohol use No     Drug use: Not on file     Sexual activity: Not on file     Other Topics Concern     Not on file     Social History Narrative     No narrative on file     No Known Allergies  Current Outpatient Prescriptions   Medication Sig Dispense Refill     acetaminophen (TYLENOL) 325 MG tablet Take 650  mg by mouth every 4 (four) hours as needed for pain.       acyclovir (ZOVIRAX) 400 MG tablet Take 400 mg by mouth 2 (two) times a day.       albuterol (PROAIR HFA;PROVENTIL HFA;VENTOLIN HFA) 90 mcg/actuation inhaler Inhale 2 puffs every 4 (four) hours as needed for wheezing.       albuterol (PROVENTIL) 2.5 mg /3 mL (0.083 %) nebulizer solution Take 2.5 mg by nebulization every 6 (six) hours as needed for wheezing.        artificial tears,hypromellose, (GENTEAL; SYSTANE) 0.3 % Gel Administer 1 application to the right eye 4 (four) times a day.       codeine-guaiFENesin (GUAIFENESIN AC)  mg/5 mL liquid Take 10 mL by mouth every 4 (four) hours as needed.        colchicine 0.6 mg tablet Take 0.6 mg by mouth daily.       fluticasone-vilanterol (BREO ELLIPTA) 100-25 mcg/dose DsDv inhaler Take 1 Inhalation by mouth daily.       hydrocortisone (CORTEF) 10 MG tablet Take 15 mg by mouth daily.       No current facility-administered medications for this visit.      REVIEW OF SYSTEMS:    Currently, no fever, chills, or rigors. Does not have any visual or hearing problems. Denies any chest pain, headaches, palpitations, lightheadedness, dizziness, or hortness of breath. Appetite is good. Denies any GERD symptoms. Denies any difficulty with swallowing, nausea, or vomiting.  Denies any abdominal pain, diarrhea or constipation. Denies any urinary symptoms. No insomnia. No active bleeding. No rash.     PHYSICAL EXAMINATION:  Vitals:    01/26/18 1958   BP: 112/70   Pulse: 75   Resp: 18   Temp: 98  F (36.7  C)   SpO2: 97%   Weight: 113 lb (51.3 kg)     GENERAL: Awake, Alert, oriented x3, not in any form of acute distress, answers questions appropriately, follows simple commands, conversant  HEENT: Head is normocephalic with normal hair distribution. No evidence of trauma. Ears: No acute purulent discharge. Eyes: Conjunctivae pink with no scleral jaundice. Nose: Normal mucosa and septum. NECK: Supple with no cervical or  supraclavicular lymphadenopathy. Trachea is midline.   CHEST: No tenderness or deformity, no crepitus  LUNG: Clear to auscultation with good chest expansion. There are no crackles or wheezes, normal AP diameter.  BACK: No kyphosis of the thoracic spine. Symmetric, no curvature, ROM normal, no CVA tenderness, no spinal tenderness   CVS: There is good S1  S2, there are no murmurs, rubs, gallops, or heaves, rhythm is regular,  2+ pulses symmetric in all extremities.  ABDOMEN: Globular and soft, nontender to palpation, non distended, no masses, no organomegaly, good bowel sounds, no rebound or guarding, no peritoneal signs.   EXTREMITIES:  Full range of motion on both upper and lower extremities, there is no tenderness to palpation, no pedal edema, no cyanosis or clubbing, no calf tenderness.  Pulses equal in all extremities, normal cap refill, no joint swelling.  SKIN: Warm and dry, no erythema noted.  Skin color, texture, no rashes or lesions.  NEUROLOGICAL: The patient is oriented to person, place and time. Strength and sensation are grossly intact. Face is symmetric.    LABS:     Lab Results   Component Value Date    WBC 6.7 12/06/2017    HGB 8.2 (L) 12/06/2017    HCT 26.0 (L) 12/06/2017    MCV 73 (L) 12/06/2017     12/06/2017       ASSESSMENT/PLAN:    1. Mycobacterium abscessus infection - Followed by ID in the past.   No cure just treating the symptoms   2. H/O cerebral infarction - No new neurological symptoms   3. Cough - Due to Mycobacterium abscessus infection -Continue Tessalon Perles   4. SOB (shortness of breath) - Resolved   5. Hearing loss - Stable         CARE PLAN: The care plan has been reviewed and discussed with patient and nursing staff. No changes made at this time.  We will continue to monitor        Electronically signed by:  Jacob Gray CNP

## 2021-06-16 PROBLEM — E87.6 HYPOKALEMIA: Status: ACTIVE | Noted: 2018-04-11

## 2021-06-16 PROBLEM — M10.9 GOUT: Status: ACTIVE | Noted: 2018-04-11

## 2021-06-16 PROBLEM — A31.8 MYCOBACTERIUM ABSCESSUS INFECTION: Status: ACTIVE | Noted: 2018-04-11

## 2021-06-16 NOTE — PROGRESS NOTES
[facility]    Facility:   [facility] [[id number]]   Code Status: FULL CODE      CHIEF COMPLAINT/REASON FOR VISIT:  Chief Complaint   Patient presents with     Review Of Multiple Medical Conditions       HISTORY:      HPI: Andriy is a 67 y.o. male has a known history of chronic Mycobacterium abscess some.  With resultant chronic cough.  Also wants chronic suppression for history of keratitis.  She is following up with infectious disease and pulmonology.  No other treatment recommendations as there has been no proven cure for his Mycobacterium.  I have suggested that perhaps she can resort to some complementary modalities such as oriental medicine to improve lung function.  She and sister was very interested in this idea.  However I found out that he has not gone yet.  Still having    Paroxysms of cough.  Not much sputum.  He has gained weight.  Appetite had improved significantly.  He is also starting to mingle with other residents in today he in fact came out and joined the games/activities.    Past Medical History:   Diagnosis Date     Hearing loss      Mycobacterium abscessus infection      Pneumonia      Sepsis      Sinusitis      Thrombocytopenia      Tophaceous gout      Transient neurologic deficit              No family history on file.  Social History     Social History     Marital status:      Spouse name: N/A     Number of children: N/A     Years of education: N/A     Social History Main Topics     Smoking status: Never Smoker     Smokeless tobacco: Never Used     Alcohol use No     Drug use: Not on file     Sexual activity: Not on file     Other Topics Concern     Not on file     Social History Narrative     No narrative on file         Review of Systems  Denies chest pains denies any shortness of breath denies abdominal pain no nausea no vomiting no fevers  She does complain of persistent itchiness on her his back Sarna lotion not helping.  Very dry skin  .There  were no vitals filed for this visit.    Physical Exam  Vital signs noted stable his weight had gone up from 113-127 since admission total of 14 pounds  Patient is alert, awake, oriented to time, place and person, not in acute cardiorespiratory distress  Skin: Warm, and dry especially on the back , excoriations noted on the back ,   Head: atraumatic, normocephalic,   Eyes: EOM's intact and conjugate, pink palpebral conjunctivae, anicteric sclerae, pupils reactive  Lungs : Decreased but clear to auscultation , no crackles, wheezes or rhonchi  Heart : Nornal first and second heart sounds, No murmurs, heaves, or thrills  Abdomen: Soft, non tender, non distended, no hepatosplenomegaly, no ascites  Extremities: No edema, pulses are full and equal      LABS:   No results found for this or any previous visit (from the past 72 hour(s)).      ASSESSMENT:      ICD-10-CM    1. Pruritus L29.9    2. Chronic cough R05    3. Mycobacterium abscessus infection A31.9    4. Bronchiectasis J47.9        PLAN:    Change Sarna lotion to Benadryl cream.  Advised moisturizers daily lotion.  Increase oral fluid intake.  Add separate call lozenges to his current regimen of guaifenesin.  Continue with duo nebs.  Pursue consultation with [facility] [place] [facility] in [place].      Total 25 minutes of which 55% was spent counseling and coordination of care of the above plan.    Electronically signed by: Wen Anaya MD who was seen today in close follow-up of his multiple issues.

## 2021-06-17 NOTE — PROGRESS NOTES
[facility]    Facility:   [facility] [[id number]]   Code Status: DNR      CHIEF COMPLAINT/REASON FOR VISIT:  Chief Complaint   Patient presents with     Review Of Multiple Medical Conditions       HISTORY:      HPI: Andriy is a 67 y.o. male  Residing in the LTC at [facility]. 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 He is being treated symptomatically.      Today he is being  seen in his room as a first routine visit. His orders and labs were reviewed. . He was in no acute distress. He has a chronic cough and takes robitussin with codeine, nebulizer and steroid inhalers. His appetite is fair and I am told his family brings in food daily.  He denies CP and his SOB has not increased from his baseline. He sats at 96% on RA. He reports no trouble with bowels or bladder. He has no teeth and tells me is is not interested in any. His uric acid was elevated in the beginning of March, he was on colchicine it stabilized and he continues now  on allupurinol    Past Medical History:   Diagnosis Date     Hearing loss      Mycobacterium abscessus infection      Pneumonia      Sepsis      Sinusitis      Thrombocytopenia      Tophaceous gout      Transient neurologic deficit              No family history on file.  Social History     Social History     Marital status:      Spouse name: N/A     Number of children: N/A     Years of education: N/A     Social History Main Topics     Smoking status: Never Smoker     Smokeless tobacco: Never Used     Alcohol use No     Drug use: Not on file     Sexual activity: Not on file     Other Topics Concern     Not on file     Social History Narrative     No narrative on file         Review of Systems   Constitutional: Positive for fatigue. Negative for activity change, appetite change, chills and fever.   HENT: Negative for congestion and  sore throat.    Eyes: Negative for visual disturbance.   Respiratory: Positive for choking and wheezing. Negative for cough and shortness of breath.         No wheezing today but has occasional  wheezing    Cardiovascular: Negative for chest pain and leg swelling.   Gastrointestinal: Negative for abdominal distention, abdominal pain, constipation, diarrhea and nausea.   Genitourinary: Negative for dysuria.   Musculoskeletal: Negative for arthralgias and myalgias.   Skin: Negative for color change, rash and wound.   Neurological: Negative for dizziness, weakness and numbness.   Psychiatric/Behavioral: Negative for agitation, behavioral problems and sleep disturbance.       .  Vitals:    04/11/18 1850   BP: 118/70   Pulse: 82   Resp: 18   Temp: 98.1  F (36.7  C)   SpO2: 96%   Weight: 121 lb 6.4 oz (55.1 kg)       Physical Exam   Constitutional: He appears well-developed and well-nourished.   Pleasant gentleman in no acute distress.    HENT:   Head: Normocephalic.   Wears a right hearing aid.   endentulous   Eyes: Conjunctivae are normal.   Neck: Normal range of motion.   Cardiovascular: Normal rate, regular rhythm and normal heart sounds.    No murmur heard.  Pulmonary/Chest: Breath sounds normal. No respiratory distress. He has no wheezes. He has no rales.   Coarse LLL   Abdominal: Soft. Bowel sounds are normal. He exhibits no distension. There is no tenderness.   Musculoskeletal: Normal range of motion. He exhibits no edema.   Large bump top of right foot- reports he has had a long time- denies pain.    Neurological: He is alert.   He could not tell me the name of the facility   Skin: Skin is warm.   Psychiatric: He has a normal mood and affect. His behavior is normal.         LABS:   BMP pending   No results found for this or any previous visit (from the past 240 hour(s)).       Current Outpatient Prescriptions   Medication Sig     albuterol (PROAIR HFA;PROVENTIL HFA;VENTOLIN HFA) 90 mcg/actuation inhaler Inhale 2  puffs every 4 (four) hours as needed for wheezing.     albuterol (PROVENTIL) 2.5 mg /3 mL (0.083 %) nebulizer solution Take 2.5 mg by nebulization every 6 (six) hours as needed for wheezing.      allopurinol (ZYLOPRIM) 100 MG tablet Take 100 mg by mouth daily.     artificial tears,hypromellose, (GENTEAL; SYSTANE) 0.3 % Gel Administer 1 application to the right eye 4 (four) times a day.     benzocaine-menthol (CEPACOL) 15-3.6 mg Take 1 lozenge by mouth every 6 (six) hours as needed.     camphor-menthol (SARNA) lotion Apply 1 application topically as needed for itching.     codeine-guaiFENesin (GUAIFENESIN AC)  mg/5 mL liquid Take 10 mL by mouth every 4 (four) hours as needed.      diphenhydrAMINE (BENADRYL) 25 mg capsule Take 25 mg by mouth every 8 (eight) hours as needed for itching (d/c if not used for 10 days in a row).     diphenhydrAMINE-zinc acetate cream Apply 1 application topically 3 (three) times a day as needed for itching.     fluticasone-vilanterol (BREO ELLIPTA) 100-25 mcg/dose DsDv inhaler Take 1 Inhalation by mouth daily.     hydrocortisone (CORTEF) 10 MG tablet Take 15 mg by mouth daily.     prednisoLONE acetate (PRED-FORTE) 1 % ophthalmic suspension Administer 1 drop to both eyes daily. Until further instructed     valACYclovir (VALTREX) 1000 MG tablet Take 1,000 mg by mouth at bedtime. Until further notice     ASSESSMENT:      ICD-10-CM    1. Mycobacterium abscessus infection A31.9    2. Gout M10.9    3. Hypokalemia E87.6        PLAN:    Mycobacterium Abscess- chronic continue nebulizers, inhalers, Followed by infectious disease in the past- no cure. Treating symptoms.   Gout in remission continue allopurinol  Hypokalemia last Potassium 3.3- lab pending   HX CVA- no  New deficits noted Moves all extremities  Hearing loss- wears a hearing aid on th right side    Electronically signed by: Ingrid Choe CNP

## 2021-06-17 NOTE — PROGRESS NOTES
[facility]    Facility:   [facility] [[id number]]   Code Status: DNR  PCP: Ingrid Choe CNP   Phone: [phone]   Fax: [phone]      CHIEF COMPLAINT/REASON FOR VISIT:  Chief Complaint   Patient presents with     Discharge Summary       HISTORY COURSE:  Andriy is a 67 y.o. male  Residing in the LTC at [facility]. 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 He is being treated symptomatically.      Today he is being  seen in his room as a face to face. He will discharge on 4/26/18.. His orders and labs were reviewed. . He was in no acute distress. He has a chronic cough and takes robitussin with codeine, nebulizer and steroid inhalers. His appetite is fair and  his family brings in food daily.  He denies CP and his SOB has not increased from his baseline. He sats at 95% on RA. He reports no trouble with bowels or bladder. He is edentulous and does want dentures.  His uric acid was elevated in the beginning of March, he was on colchicine it stabilized and he continues now  on allopurinol.  Patient will discharge to the [facility] assisted-living facility with all medications and supplies services to include; homemaking, meal preparation and set up, PCA; including bathing, dressing and transfer system, medication management, socialization assistance. He is concerned his family will not be able to assist with the move but per the  his family is well aware and will be here on 4/26/18 to assist.       ANDRIY JOHNSON requires an electric hospital bed with side rails for lifetime (89 years).  My patient requires body positioning   not feasible in an  ordinary bed to alleviate pain due to gout, unspecified and muscle weakness due to disorder of adrenal gland, unspecified  Patient requires head of bed elevated more than 30  and frequent changes in body position due to  shortness of breath,  pulmonary mycobacterial infection and bronchiectasis uncomplicated as well as to facilitate with transfers from bed to chair.    Review of Systems  Constitutional: Positive for fatigue. Negative for activity change, appetite change, chills and fever.   HENT: Negative for congestion and sore throat.    Eyes: Negative for visual disturbance.   Respiratory: Positive for choking and wheezing. Negative for cough and shortness of breath.         No wheezing today but has occasional  wheezing    Cardiovascular: Negative for chest pain and leg swelling.   Gastrointestinal: Negative for abdominal distention, abdominal pain, constipation, diarrhea and nausea.   Genitourinary: Negative for dysuria.   Musculoskeletal: Negative for arthralgias and myalgias.   Skin: Negative for color change, rash and wound.   Neurological: Negative for dizziness, weakness and numbness.   Psychiatric/Behavioral: Negative for agitation, behavioral problems and sleep disturbance.     Vitals:    04/24/18 2132   BP: 115/67   Pulse: 80   Resp: 17   Temp: 97.7  F (36.5  C)   SpO2: 95%   Weight: 121 lb 6.4 oz (55.1 kg)       Physical Exam  Constitutional: He appears well-developed and well-nourished.   Pleasant gentleman in no acute distress.    HENT:   Head: Normocephalic.   Wears a right hearing aid.   endentulous   Eyes: Conjunctivae are normal.   Neck: Normal range of motion.   Cardiovascular: Normal rate, regular rhythm and normal heart sounds.    No murmur heard.  Pulmonary/Chest: Breath sounds normal. No respiratory distress. He has no wheezes. He has no rales.   Coarse LLL   Abdominal: Soft. Bowel sounds are normal. He exhibits no distension. There is no tenderness.   Musculoskeletal: Normal range of motion. He exhibits no edema.   Large bump top of right foot- reports he has had a long time- denies pain.    Neurological: He is alert.   He could not tell me the name of the facility   Skin: Skin is warm.   Psychiatric: He  has a normal mood and affect. His behavior is normal.     MEDICATION LIST:  Current Outpatient Prescriptions   Medication Sig     albuterol (PROAIR HFA;PROVENTIL HFA;VENTOLIN HFA) 90 mcg/actuation inhaler Inhale 2 puffs every 4 (four) hours as needed for wheezing.     albuterol (PROVENTIL) 2.5 mg /3 mL (0.083 %) nebulizer solution Take 2.5 mg by nebulization every 6 (six) hours as needed for wheezing.      allopurinol (ZYLOPRIM) 100 MG tablet Take 100 mg by mouth daily.     artificial tears,hypromellose, (GENTEAL; SYSTANE) 0.3 % Gel Administer 1 application to the right eye 4 (four) times a day.     benzocaine-menthol (CEPACOL) 15-3.6 mg Take 1 lozenge by mouth every 6 (six) hours as needed.     camphor-menthol (SARNA) lotion Apply 1 application topically as needed for itching.     codeine-guaiFENesin (GUAIFENESIN AC)  mg/5 mL liquid Take 10 mL by mouth every 4 (four) hours as needed.      diphenhydrAMINE (BENADRYL) 25 mg capsule Take 25 mg by mouth every 8 (eight) hours as needed for itching (d/c if not used for 10 days in a row).     diphenhydrAMINE-zinc acetate cream Apply 1 application topically 3 (three) times a day as needed for itching.     fluticasone-vilanterol (BREO ELLIPTA) 100-25 mcg/dose DsDv inhaler Take 1 Inhalation by mouth daily.     hydrocortisone (CORTEF) 10 MG tablet Take 15 mg by mouth daily.     prednisoLONE acetate (PRED-FORTE) 1 % ophthalmic suspension Administer 1 drop to both eyes daily. Until further instructed     valACYclovir (VALTREX) 1000 MG tablet Take 1,000 mg by mouth at bedtime. Until further notice       DISCHARGE DIAGNOSIS:    ICD-10-CM    1. Mycobacterium abscessus infection A31.9    2. Hypokalemia E87.6    3. Gout M10.9    4. Chronic cough R05        MEDICAL EQUIPMENT NEEDS:  Ambulates independently but he does have a cane in his room    DISCHARGE PLAN/FACE TO FACE:  I certify that services are/were furnished while this patient was under the care of a physician and that a  physician or an allowed non-physician practitioner (NPP), had a face-to-face encounter that meets the physician face-to-face encounter requirements. The encounter was in whole, or in part, related to the primary reason for home health. The patient is confined to his/her home and needs intermittent skilled nursing, physical therapy, speech-language pathology, or the continued need for occupational therapy. A plan of care has been established by a physician and is periodically reviewed by a physician.  Date of Face-to-Face Encounter: 4/24/18    I certify that, based on my findings, the following services are medically necessary home health services:  Patient will discharge to the [facility] assisted-living facility with all medications and supplies services to include; homemaking, meal preparation and set up, PCA; including bathing, dressing and transfer system, medication management, socialization assistance.    My clinical findings support the need for the above skilled services because: Pt discharging to [facility] and does not require homebound status  This patient is homebound because: N/A  The patient is, or has been, under my care and I have initiated the establishment of the plan of care. This patient will be followed by a physician who will periodically review the plan of care.    Schedule follow up visit with primary care provider within 7 days to reestablish care.    Electronically signed by: Ingrid Choe CNP  NPI number; [id number]